# Patient Record
Sex: MALE
[De-identification: names, ages, dates, MRNs, and addresses within clinical notes are randomized per-mention and may not be internally consistent; named-entity substitution may affect disease eponyms.]

---

## 2020-08-08 ENCOUNTER — HOSPITAL ENCOUNTER (INPATIENT)
Dept: HOSPITAL 88 - ER | Age: 35
LOS: 2 days | Discharge: HOME | DRG: 683 | End: 2020-08-10
Attending: INTERNAL MEDICINE | Admitting: INTERNAL MEDICINE
Payer: COMMERCIAL

## 2020-08-08 VITALS — HEIGHT: 66 IN | WEIGHT: 175 LBS | BODY MASS INDEX: 28.12 KG/M2

## 2020-08-08 DIAGNOSIS — N17.9: Primary | ICD-10-CM

## 2020-08-08 DIAGNOSIS — M62.82: ICD-10-CM

## 2020-08-08 DIAGNOSIS — Z11.59: ICD-10-CM

## 2020-08-08 DIAGNOSIS — E86.0: ICD-10-CM

## 2020-08-08 LAB
ALBUMIN SERPL-MCNC: 6.4 G/DL (ref 3.5–5)
ALBUMIN/GLOB SERPL: 1.4 {RATIO} (ref 0.8–2)
ALP SERPL-CCNC: 114 IU/L (ref 40–150)
ALT SERPL-CCNC: 114 IU/L (ref 0–55)
ANION GAP SERPL CALC-SCNC: 30.2 MMOL/L (ref 8–16)
BASOPHILS # BLD AUTO: 0 10*3/UL (ref 0–0.1)
BASOPHILS NFR BLD AUTO: 0.1 % (ref 0–1)
BUN SERPL-MCNC: 27 MG/DL (ref 7–26)
BUN/CREAT SERPL: 5 (ref 6–25)
CALCIUM SERPL-MCNC: 12.3 MG/DL (ref 8.4–10.2)
CHLORIDE SERPL-SCNC: 90 MMOL/L (ref 98–107)
CK MB SERPL-MCNC: 7.5 NG/ML (ref 0–5)
CK SERPL-CCNC: 1437 IU/L (ref 30–200)
CO2 SERPL-SCNC: 21 MMOL/L (ref 22–29)
DEPRECATED NEUTROPHILS # BLD AUTO: 24 10*3/UL (ref 2.1–6.9)
EGFRCR SERPLBLD CKD-EPI 2021: 14 ML/MIN (ref 60–?)
EOSINOPHIL # BLD AUTO: 0 10*3/UL (ref 0–0.4)
EOSINOPHIL NFR BLD AUTO: 0 % (ref 0–6)
ERYTHROCYTE [DISTWIDTH] IN CORD BLOOD: 12.2 % (ref 11.7–14.4)
GLOBULIN PLAS-MCNC: 4.7 G/DL (ref 2.3–3.5)
GLUCOSE SERPLBLD-MCNC: 112 MG/DL (ref 74–118)
HCT VFR BLD AUTO: 49.7 % (ref 38.2–49.6)
HGB BLD-MCNC: 17.3 G/DL (ref 14–18)
LYMPHOCYTES # BLD: 2 10*3/UL (ref 1–3.2)
LYMPHOCYTES NFR BLD AUTO: 7 % (ref 18–39.1)
MAGNESIUM SERPL-MCNC: 2.5 MG/DL (ref 1.3–2.1)
MCH RBC QN AUTO: 27.5 PG (ref 28–32)
MCHC RBC AUTO-ENTMCNC: 34.8 G/DL (ref 31–35)
MCV RBC AUTO: 79.1 FL (ref 81–99)
MONOCYTES # BLD AUTO: 2.7 10*3/UL (ref 0.2–0.8)
MONOCYTES NFR BLD AUTO: 9.3 % (ref 4.4–11.3)
NEUTS SEG NFR BLD AUTO: 82.7 % (ref 38.7–80)
PLATELET # BLD AUTO: 288 X10E3/UL (ref 140–360)
POTASSIUM SERPL-SCNC: 4.2 MMOL/L (ref 3.5–5.1)
RBC # BLD AUTO: 6.28 X10E6/UL (ref 4.3–5.7)
SODIUM SERPL-SCNC: 137 MMOL/L (ref 136–145)

## 2020-08-08 PROCEDURE — 80053 COMPREHEN METABOLIC PANEL: CPT

## 2020-08-08 PROCEDURE — 36415 COLL VENOUS BLD VENIPUNCTURE: CPT

## 2020-08-08 PROCEDURE — 81001 URINALYSIS AUTO W/SCOPE: CPT

## 2020-08-08 PROCEDURE — 99284 EMERGENCY DEPT VISIT MOD MDM: CPT

## 2020-08-08 PROCEDURE — 83735 ASSAY OF MAGNESIUM: CPT

## 2020-08-08 PROCEDURE — 82553 CREATINE MB FRACTION: CPT

## 2020-08-08 PROCEDURE — 80048 BASIC METABOLIC PNL TOTAL CA: CPT

## 2020-08-08 PROCEDURE — 84484 ASSAY OF TROPONIN QUANT: CPT

## 2020-08-08 PROCEDURE — 83690 ASSAY OF LIPASE: CPT

## 2020-08-08 PROCEDURE — 82550 ASSAY OF CK (CPK): CPT

## 2020-08-08 PROCEDURE — 80320 DRUG SCREEN QUANTALCOHOLS: CPT

## 2020-08-08 PROCEDURE — 80307 DRUG TEST PRSMV CHEM ANLYZR: CPT

## 2020-08-08 PROCEDURE — 85025 COMPLETE CBC W/AUTO DIFF WBC: CPT

## 2020-08-09 VITALS — DIASTOLIC BLOOD PRESSURE: 86 MMHG | SYSTOLIC BLOOD PRESSURE: 128 MMHG

## 2020-08-09 VITALS — SYSTOLIC BLOOD PRESSURE: 128 MMHG | DIASTOLIC BLOOD PRESSURE: 76 MMHG

## 2020-08-09 VITALS — SYSTOLIC BLOOD PRESSURE: 118 MMHG | DIASTOLIC BLOOD PRESSURE: 66 MMHG

## 2020-08-09 VITALS — SYSTOLIC BLOOD PRESSURE: 127 MMHG | DIASTOLIC BLOOD PRESSURE: 78 MMHG

## 2020-08-09 VITALS — SYSTOLIC BLOOD PRESSURE: 117 MMHG | DIASTOLIC BLOOD PRESSURE: 58 MMHG

## 2020-08-09 VITALS — DIASTOLIC BLOOD PRESSURE: 58 MMHG | SYSTOLIC BLOOD PRESSURE: 117 MMHG

## 2020-08-09 LAB
AMPHETAMINES UR QL SCN>1000 NG/ML: NEGATIVE
ANION GAP SERPL CALC-SCNC: 12 MMOL/L (ref 8–16)
BACTERIA URNS QL MICRO: (no result) /HPF
BASOPHILS # BLD AUTO: 0 10*3/UL (ref 0–0.1)
BASOPHILS NFR BLD AUTO: 0.4 % (ref 0–1)
BENZODIAZ UR QL SCN: NEGATIVE
BILIRUB UR QL: (no result)
BUN SERPL-MCNC: 22 MG/DL (ref 7–26)
BUN/CREAT SERPL: 15 (ref 6–25)
CALCIUM SERPL-MCNC: 8.9 MG/DL (ref 8.4–10.2)
CHLORIDE SERPL-SCNC: 106 MMOL/L (ref 98–107)
CK MB SERPL-MCNC: 5.3 NG/ML (ref 0–5)
CK SERPL-CCNC: 1785 IU/L (ref 30–200)
CLARITY UR: (no result)
CO2 SERPL-SCNC: 22 MMOL/L (ref 22–29)
COLOR UR: (no result)
DEPRECATED NEUTROPHILS # BLD AUTO: 7.7 10*3/UL (ref 2.1–6.9)
EGFRCR SERPLBLD CKD-EPI 2021: 56 ML/MIN (ref 60–?)
EOSINOPHIL # BLD AUTO: 0.1 10*3/UL (ref 0–0.4)
EOSINOPHIL NFR BLD AUTO: 0.5 % (ref 0–6)
EOSINOPHIL NFR BLD MANUAL: 2 % (ref 0–7)
EPI CELLS URNS QL MICRO: (no result) /LPF
ERYTHROCYTE [DISTWIDTH] IN CORD BLOOD: 12.4 % (ref 11.7–14.4)
GLUCOSE SERPLBLD-MCNC: 104 MG/DL (ref 74–118)
HCT VFR BLD AUTO: 37.3 % (ref 38.2–49.6)
HGB BLD-MCNC: 12.6 G/DL (ref 14–18)
KETONES UR QL STRIP.AUTO: NEGATIVE
LEUKOCYTE ESTERASE UR QL STRIP.AUTO: NEGATIVE
LYMPHOCYTES # BLD: 1.5 10*3/UL (ref 1–3.2)
LYMPHOCYTES NFR BLD AUTO: 14.4 % (ref 18–39.1)
LYMPHOCYTES NFR BLD MANUAL: 7 % (ref 19–48)
MCH RBC QN AUTO: 27.5 PG (ref 28–32)
MCHC RBC AUTO-ENTMCNC: 33.8 G/DL (ref 31–35)
MCV RBC AUTO: 81.4 FL (ref 81–99)
METAMYELOCYTES NFR BLD MANUAL: 1 % (ref 0–0)
MONOCYTES # BLD AUTO: 1.3 10*3/UL (ref 0.2–0.8)
MONOCYTES NFR BLD AUTO: 12.4 % (ref 4.4–11.3)
MONOCYTES NFR BLD MANUAL: 10 % (ref 3.4–9)
MYELOCYTES NFR BLD MANUAL: 1 % (ref 0–0)
NEUTS SEG NFR BLD AUTO: 72 % (ref 38.7–80)
NEUTS SEG NFR BLD MANUAL: 79 % (ref 40–74)
NITRITE UR QL STRIP.AUTO: NEGATIVE
PCP UR QL SCN: NEGATIVE
PLAT MORPH BLD: NORMAL
PLATELET # BLD AUTO: 165 X10E3/UL (ref 140–360)
PLATELET # BLD EST: ADEQUATE 10*3/UL
POTASSIUM SERPL-SCNC: 4 MMOL/L (ref 3.5–5.1)
PROT UR QL STRIP.AUTO: (no result)
RBC # BLD AUTO: 4.58 X10E6/UL (ref 4.3–5.7)
RBC MORPH BLD: NORMAL
SODIUM SERPL-SCNC: 136 MMOL/L (ref 136–145)
SP GR UR STRIP: >=1.03 (ref 1.01–1.02)
UROBILINOGEN UR STRIP-MCNC: 2 MG/DL (ref 0.2–1)

## 2020-08-09 RX ADMIN — SODIUM CHLORIDE SCH MLS/HR: 9 INJECTION, SOLUTION INTRAVENOUS at 15:45

## 2020-08-09 RX ADMIN — SODIUM CHLORIDE SCH MLS/HR: 9 INJECTION, SOLUTION INTRAVENOUS at 01:09

## 2020-08-09 RX ADMIN — SODIUM CHLORIDE SCH MLS/HR: 9 INJECTION, SOLUTION INTRAVENOUS at 10:45

## 2020-08-09 RX ADMIN — SODIUM CHLORIDE SCH MLS/HR: 9 INJECTION, SOLUTION INTRAVENOUS at 05:45

## 2020-08-09 RX ADMIN — SODIUM CHLORIDE SCH MLS/HR: 9 INJECTION, SOLUTION INTRAVENOUS at 23:07

## 2020-08-09 NOTE — EMERGENCY DEPARTMENT NOTE
History of Present Illnes


History of Present Illness


Chief Complaint:  General Medicine Complaints


History of Present Illness


This is a 35 year old  male  PRESENTS TO ED WITH CRAMPS TO BLE, ABDOMEN AND 

NAUSEA; PT


   REPORTS WORKED OUTSIDE "ALL DAY AND THEN I HAD ABOUT 3 BEERS. STATES HE DID 

   NOT REALLY DRINK MUCH WATER  .


Historian:  Patient


Arrival Mode:  Car


Onset (how long ago):  hour(s) (3)


Location:  LEGS LOWER ABD


Quality:  CRAMPING


Radiation:  Reports non-radiation


Severity:  severe


Onset quality:  gradual


Duration (how long):  hour(s) (3)


Timing of current episode:  constant


Progression:  worsening


Chronicity:  new


Context:  Reports other (WORKED OUTSIDE IN HEAT ALL DAY); 


   Denies recent illness, Denies recent surgery


Relieving factors:  none


Exacerbating factors:  none


Associated symptoms:  Reports denies other symptoms





Past Medical/Family History


Physician Review


I have reviewed the patient's past medical and family history.  Any updates have

been documented here.





Past Medical History


Recent Fever:  No


Clinical Suspicion of Infectio:  No


New/Unexplained Change in Ment:  No


Past Medical History:  None


Past Surgical History:  None





Social History


Smoking Cessation:  Never Smoker


Alcohol Use:  Social


Any Illegal Drug Use:  No





Review of Systems


Review of Systems


Constitutional:  Reports no symptoms


EENTM:  Reports no symptoms


Cardiovascular:  Reports no symptoms


Respiratory:  Reports no symptoms


Gastrointestinal:  Reports as per HPI


Genitourinary:  Reports no symptoms


Musculoskeletal:  Reports as per HPI


Integumentary:  Reports no symptoms


Neurological:  Reports no symptoms


Psychological:  Reports no symptoms


Endocrine:  Reports no symptoms


Hematological/Lymphatic:  Reports no symptoms





Physical Exam


Related Data


Allergies:  


Coded Allergies:  


     No Known Allergies (Unverified , 8/8/20)


Triage Vital Signs





Vital Signs








  Date Time  Temp Pulse Resp B/P (MAP) Pulse Ox O2 Delivery O2 Flow Rate FiO2


 


8/8/20 21:28 99.6 97 21 133/96 99 Room Air  








Vital signs reviewed:  Yes





Physical Exam


CONSTITUTIONAL





Constitutional:  Present well-developed, Present well-nourished, Present 

distressed (MILD)


HENT


HENT:  Present normocephalic, Present atraumatic, Present oropharynx 

clear/moist, Present nose normal


HENT L/R:  Present left ext ear normal, Present right ext ear normal


EYES





Eyes:  Reports PERRL, Reports conjunctivae normal


NECK


Neck:  Present ROM normal


PULMONARY


Pulmonary:  Present effort normal, Present breath sounds normal


CARDIOVASCULAR





Cardiovascular:  Present regular rhythm, Present heart sounds normal, Present 

capillary refill normal, Present normal rate


GASTROINTESTINAL





Abdominal:  Present soft, Present nontender, Present bowel sounds normal


GENITOURINARY





Genitourinary:  Present exam deferred


SKIN


Skin:  Present warm, Present dry


MUSCULOSKELETAL





Musculoskeletal:  Present ROM normal


NEUROLOGICAL





Neurological:  Present alert, Present oriented x 3, Present no gross motor or 

sensory deficits


PSYCHOLOGICAL


Psychological:  Present mood/affect normal, Present judgement normal





Results


Laboratory


Result Diagram:  


8/8/20 2230 8/8/20 2230





Laboratory





Laboratory Tests








Test


 8/8/20


23:30 8/8/20


22:35 8/8/20


22:30


 


Lipase 73 U/L (8-78)   


 


Urine Color  Milagros (YELLOW)  


 


Urine Clarity  Cloudy (CLEAR)  


 


Urine pH  6 (5 - 7)  


 


Urine Specific Gravity


 


 >=1.030


(1.010-1.025) 





 


Urine Protein  2+ (NEGATIVE)  


 


Urine Glucose (UA)


 


 Negative


(NEGATIVE) 





 


Urine Ketones


 


 Negative


(NEGATIVE) 





 


Urine Blood


 


 Moderate


(NEGATIVE) 





 


Urine Nitrite


 


 Negative


(NEGATIVE) 





 


Urine Bilirubin


 


 Small


(NEGATIVE) 





 


Urine Urobilinogen


 


 2.0 mg/dL (0.2


- 1) 





 


Urine Leukocyte Esterase


 


 Negative


(NEGATIVE) 





 


Urine RBC


 


 11-20 /HPF


(0-5) 





 


Urine WBC


 


 11-20 /HPF


(0-5) 





 


Urine Epithelial Cells


 


 Many /LPF


(NONE) 





 


Urine Bacteria


 


 Many /HPF


(NONE) 





 


Urine Opiates Screen


 


 Negative


(NEGATIVE) 





 


Urine Methadone Screen


 


 Negative


(NEGATIVE) 





 


Urine Barbiturates Screen


 


 Negative


(NEGATIVE) 





 


Urine Phencyclidine Screen


 


 Negative


(NEGATIVE) 





 


Urine Amphetamines Screen


 


 Negative


(NEGATIVE) 





 


Urine Methamphetamines Screen


 


 Negative


(NEGATIVE) 





 


Urine Benzodiazepines Screen


 


 Negative


(NEGATIVE) 





 


Urine Cocaine Screen


 


 Negative


(NEGATIVE) 





 


Urine Cannabinoids Screen


 


 Negative


(NEGATIVE) 





 


White Blood Count


 


 


 29.02 x10e3/uL


(4.8-10.8)


 


Red Blood Count


 


 


 6.28 x10e6/uL


(4.3-5.7)


 


Hemoglobin


 


 


 17.3 g/dL


(14.0-18.0)


 


Hematocrit


 


 


 49.7 %


(38.2-49.6)


 


Mean Corpuscular Volume


 


 


 79.1 fL


(81-99)


 


Mean Corpuscular Hemoglobin


 


 


 27.5 pg


(28-32)


 


Mean Corpuscular Hemoglobin


Concent 


 


 34.8 g/dL


(31-35)


 


Red Cell Distribution Width


 


 


 12.2 %


(11.7-14.4)


 


Platelet Count


 


 


 288 x10e3/uL


(140-360)


 


Neutrophils (%) (Auto)


 


 


 82.7 %


(38.7-80.0)


 


Lymphocytes (%) (Auto)


 


 


 7.0 %


(18.0-39.1)


 


Monocytes (%) (Auto)


 


 


 9.3  %


(4.4-11.3)


 


Eosinophils (%) (Auto)


 


 


 0.0 %


(0.0-6.0)


 


Basophils (%) (Auto)


 


 


 0.1 %


(0.0-1.0)


 


Neutrophils # (Auto)   24.0 (2.1-6.9) 


 


Lymphocytes # (Auto)   2.0 (1.0-3.2) 


 


Monocytes # (Auto)   2.7 (0.2-0.8) 


 


Eosinophils # (Auto)   0.0 (0.0-0.4) 


 


Basophils # (Auto)   0.0 (0.0-0.1) 


 


Absolute Immature Granulocyte


(auto 


 


 0.26 x10e3/uL


(0-0.1)


 


Sodium Level


 


 


 137 mmol/L


(136-145)


 


Potassium Level


 


 


 4.2 mmol/L


(3.5-5.1)


 


Chloride Level


 


 


 90 mmol/L


()


 


Carbon Dioxide Level


 


 


 21 mmol/L


(22-29)


 


Anion Gap


 


 


 30.2 mmol/L


(8-16)


 


Blood Urea Nitrogen


 


 


 27 mg/dL


(7-26)


 


Creatinine


 


 


 4.95 mg/dL


(0.72-1.25)


 


Estimat Glomerular Filtration


Rate 


 


 14 ML/MIN


(60-)


 


BUN/Creatinine Ratio   5 (6-25) 


 


Glucose Level


 


 


 112 mg/dL


()


 


Calcium Level


 


 


 12.3 mg/dL


(8.4-10.2)


 


Magnesium Level


 


 


 2.5 MG/DL


(1.3-2.1)


 


Total Bilirubin


 


 


 0.6 mg/dL


(0.2-1.2)


 


Aspartate Amino Transf


(AST/SGOT) 


 


 61 IU/L (5-34) 





 


Alanine Aminotransferase


(ALT/SGPT) 


 


 114 IU/L


(0-55)


 


Alkaline Phosphatase


 


 


 114 IU/L


()


 


Creatine Kinase


 


 


 1437 IU/L


()


 


Creatine Kinase MB


 


 


 7.50 ng/mL


(0-5.0)


 


Troponin I


 


 


 0.095 ng/mL


(0-0.300)


 


Total Protein


 


 


 11.1 g/dL


(6.5-8.1)


 


Albumin


 


 


 6.4 g/dL


(3.5-5.0)


 


Globulin


 


 


 4.7 g/dL


(2.3-3.5)


 


Albumin/Globulin Ratio   1.4 (0.8-2.0) 


 


Ethyl Alcohol Level


 


 


 < 10.0 mg/dL


(0.0-10.0)








Lab results reviewed:  Yes





Assessment & Plan


Medical Decision Making


MDM


PT WITH CRAMPING TO LEGS AND LOWER ABD AFTER WORKING OUTSIDE IN HEAT ALL DAY





CBC,CMP, CARDIAC ENZYMES, UA ORDERED TO EVAL FOR RHABDOMYOLYSIS, ACUTE RENAL 

FAILURE, DEHYDRATION, ELECTROLYTE ABNORMALITY





1 LITER NS IV ORDERED





PT FOUND TO BE I RHABDO WITH ACUTE RENAL FAILURE, A UTI AND LEUKOCYTOSIS


I SPOKE WITH DR FORD ADMIT TO INPATIENT


ROCEPHIN 1 GRAM IV ORDERED





Assessment & Plan


Final Impression:  


(1) Rhabdomyolysis


(2) Acute renal failure


(3) UTI (urinary tract infection)


(4) Leukocytosis


Depart Disposition:  ADMITTED


Last Vital Signs











  Date Time  Temp Pulse Resp B/P (MAP) Pulse Ox O2 Delivery O2 Flow Rate FiO2


 


8/8/20 21:28 99.6 97 21 133/96 99 Room Air  








Medications in the ED





Sodium Chloride 1,000 ml @  999 mls/hr Q1H1M ONCE IV  Last administered on 

8/8/20at 22:44; Admin Dose 999 MLS/HR;  Start 8/8/20 at 22:45;  Stop 8/8/20 at 

23:45


Ondansetron HCl 4 mg NOW  STAT IV  Last administered on 8/8/20at 22:44; Admin 

Dose 4 MG;  Start 8/8/20 at 22:32;  Stop 8/8/20 at 22:33


Sodium Chloride 1,000 ml @  999 mls/hr Q1H1M ONCE IV  Last administered on 

8/8/20at 22:44; Admin Dose 999 MLS/HR;  Start 8/8/20 at 22:45;  Stop 8/8/20 at 

23:45











JYOTI WATSON MD         Aug 9, 2020 00:36

## 2020-08-09 NOTE — NUR
PT IS TRANSFERRED FROM ER PT IS AOX3 RESPIRATIONS ARE EVEN AND UNLABORED DENIES 
PAIN.ORIENTED THE PT TO THE ENVIRONMENT .ASSESSMENT DONE CALL LIGHT WITH IN REACH ,CONTINUE 
TO MONITOR

## 2020-08-09 NOTE — HISTORY AND PHYSICAL
PRIMARY CARE DOCTOR:  Dr. Mcbride.

 

CHIEF COMPLAINT:  Leg cramps.

 

HISTORY OF PRESENT ILLNESS:  This is a 35-year-old healthy, who has been working outdoor

in the hot whether, presented with nausea, bilateral lower extremities cramps and

abdominal cramps.  Denies chest pain or shortness of breath.  No fever or chills.  No

cough.  In the emergency room, the patient felt better after 1 L of fluid.  The patient

only urinated twice this morning. 

 

PAST MEDICAL AND SURGICAL HISTORY:  None.

 

MEDICATIONS:  None.

 

ALLERGIES:  NONE.

 

SOCIAL HISTORY:  Does not smoke.  Occasional alcohol.

 

FAMILY HISTORY:  Positive for diabetes.

 

REVIEW OF SYSTEMS:

A 10-point review of system obtained and nothing else is significant other than what is

stated in HPI. 

 

PHYSICAL EXAMINATION:

VITAL SIGNS:  Temperature 97.9, pulse 68, respiratory rate 18, and blood pressure

128/76. 

GENERAL:  No acute distress. 

SKIN:  No rash. 

HEENT:  Anicteric.  Oropharynx is clear. 

LUNGS:  Clear. 

HEART:  Regular rate and rhythm.  Normal S1 and S2. 

GI:  Abdomen is soft and nondistended. 

NEUROLOGIC:  Alert and oriented x3.  Cranial nerves 2 through 12 grossly intact. 

PSYCHIATRIC:  No hallucination. 

MUSCULOSKELETAL:  Painless range of motion.

LABORATORY DATA:  Laboratory wise, initial creatinine was 4.95, now is down to 1.45,

however, CPK is still going up, it is 2173.  Initial white count was 29, now is down to

10.  Initial hemoglobin was 17, now is down to 12.  Initial platelet count was 288, now

is 165. 

 

ASSESSMENT AND PLAN:  

1. Severe dehydration with acute kidney injury and rhabdomyolysis.  I will continue IV

fluid, likely his creatinine is coming down already.  However, his CPK is still going

up. 

Therefore, we will wait till tomorrow before I discharge him.

2. Gastrointestinal and deep venous thrombosis prophylaxis, not indicated.

 

 

 

______________________________

MD AUDI Michel/MEMO

D:  08/09/2020 20:21:41

T:  08/09/2020 23:24:50

Job #:  [id number]

 

cc:            [facility]

## 2020-08-09 NOTE — NUR
RECEIVED PT SITTING THE SIDE OF THE BED AOX3 .DENIES PAIN BLOOD DRAWN FOR [facility] .CALL LIGHT 
WITH IN REACH .CONTINUE TO MONITOR

## 2020-08-10 VITALS — SYSTOLIC BLOOD PRESSURE: 115 MMHG | DIASTOLIC BLOOD PRESSURE: 71 MMHG

## 2020-08-10 VITALS — DIASTOLIC BLOOD PRESSURE: 80 MMHG | SYSTOLIC BLOOD PRESSURE: 125 MMHG

## 2020-08-10 VITALS — DIASTOLIC BLOOD PRESSURE: 73 MMHG | SYSTOLIC BLOOD PRESSURE: 108 MMHG

## 2020-08-10 LAB
ALBUMIN SERPL-MCNC: 3.5 G/DL (ref 3.5–5)
ALBUMIN/GLOB SERPL: 1.3 {RATIO} (ref 0.8–2)
ALP SERPL-CCNC: 64 IU/L (ref 40–150)
ALT SERPL-CCNC: 60 IU/L (ref 0–55)
ANION GAP SERPL CALC-SCNC: 9.3 MMOL/L (ref 8–16)
BASOPHILS # BLD AUTO: 0 10*3/UL (ref 0–0.1)
BASOPHILS NFR BLD AUTO: 0.5 % (ref 0–1)
BUN SERPL-MCNC: 16 MG/DL (ref 7–26)
BUN/CREAT SERPL: 18 (ref 6–25)
CALCIUM SERPL-MCNC: 8.6 MG/DL (ref 8.4–10.2)
CHLORIDE SERPL-SCNC: 109 MMOL/L (ref 98–107)
CK SERPL-CCNC: 1856 IU/L (ref 30–200)
CO2 SERPL-SCNC: 22 MMOL/L (ref 22–29)
DEPRECATED NEUTROPHILS # BLD AUTO: 3.6 10*3/UL (ref 2.1–6.9)
EGFRCR SERPLBLD CKD-EPI 2021: > 60 ML/MIN (ref 60–?)
EOSINOPHIL # BLD AUTO: 0.1 10*3/UL (ref 0–0.4)
EOSINOPHIL NFR BLD AUTO: 2 % (ref 0–6)
ERYTHROCYTE [DISTWIDTH] IN CORD BLOOD: 12.4 % (ref 11.7–14.4)
GLOBULIN PLAS-MCNC: 2.6 G/DL (ref 2.3–3.5)
GLUCOSE SERPLBLD-MCNC: 100 MG/DL (ref 74–118)
HCT VFR BLD AUTO: 35.4 % (ref 38.2–49.6)
HGB BLD-MCNC: 11.6 G/DL (ref 14–18)
LYMPHOCYTES # BLD: 1.7 10*3/UL (ref 1–3.2)
LYMPHOCYTES NFR BLD AUTO: 28.6 % (ref 18–39.1)
MAGNESIUM SERPL-MCNC: 1.9 MG/DL (ref 1.3–2.1)
MCH RBC QN AUTO: 27.5 PG (ref 28–32)
MCHC RBC AUTO-ENTMCNC: 32.8 G/DL (ref 31–35)
MCV RBC AUTO: 83.9 FL (ref 81–99)
MONOCYTES # BLD AUTO: 0.6 10*3/UL (ref 0.2–0.8)
MONOCYTES NFR BLD AUTO: 9.8 % (ref 4.4–11.3)
NEUTS SEG NFR BLD AUTO: 58.9 % (ref 38.7–80)
PLATELET # BLD AUTO: 139 X10E3/UL (ref 140–360)
POTASSIUM SERPL-SCNC: 4.3 MMOL/L (ref 3.5–5.1)
RBC # BLD AUTO: 4.22 X10E6/UL (ref 4.3–5.7)
SODIUM SERPL-SCNC: 136 MMOL/L (ref 136–145)

## 2020-08-10 RX ADMIN — SODIUM CHLORIDE SCH MLS/HR: 9 INJECTION, SOLUTION INTRAVENOUS at 10:12

## 2020-08-10 RX ADMIN — SODIUM CHLORIDE SCH MLS/HR: 9 INJECTION, SOLUTION INTRAVENOUS at 03:32

## 2020-08-10 NOTE — NUR
BEDSIDE SHIFT REPORT RECEIVED FROM THE NIGHT SHIFT RN. EDUCATED PT ABOUT FALL PRECAUTIONS. 
PT VERBALIZED UNDERSTANDING. BED IS LOW AND LOCKED. SIDE RAILS X2. CALL LIGHT WITH IN EASY 
REACH.  ALL SAFETY MEASURES IN PLACE. PT DENIES NEEDS AT THIS TIME.

## 2020-08-10 NOTE — NUR
PT DISCHARGED HOME SAFELY WITH FAMILY MEMBER. LAC AND RAC 18 G IV'S REMOVED. TIP INTACT. 
DRESSING APPLIED. NO RX AS PER DR. VELEZ. DISCHARGE INSTRUCTIONS GIVEN AND PATIENT VERBALIZED 
UNDERSTANDING. PT ESCORTED TO THE PRIVATE AUTO AT THE FRONT ENTRANCE. PT DENIED FURTHER 
NEEDS.

## 2020-08-10 NOTE — DISCHARGE SUMMARY
PRIMARY CARE DOCTOR:  Dr. Mcbride.

 

FINAL DIAGNOSIS:  Severe dehydration with acute kidney injury and rhabdomyolysis.

 

SECONDARY DIAGNOSIS:  None.

 

CONSULTANTS:  None.

 

PROCEDURES/STUDIES PERFORMED:  None.

 

HISTORY:  Per H and P.

 

HOSPITAL COURSE:  The patient was aggressively hydrated.  His creatinine normalized and

his CPK started coming down as well. 

 

CONDITION ON DISCHARGE:  Improved.

 

DISCHARGE MEDICATIONS:  Please see medication reconciliation form. 

 

Of note, I have updated his primary care doctor about this hospitalization.

 

 

 

 

______________________________

MD AUDI Michel/MEMO

D:  08/10/2020 21:34:17

T:  08/10/2020 22:45:03

Job #:  [id number]

 

cc:            [facility]

## 2020-08-11 NOTE — NUR
COVID-19 came back positive after the patient was discharged. I called the 
patient and informed him and asked him to self quarantine for 10 days per CDC 
guideline.

## 2024-07-25 NOTE — NUR
RCD PT AT BED PT IS ALERT AND ORIENTED  RESTING ON BED IV PATENT  , BED LOW AND LOCKED CALL 
LIGHT IN REACH Unknown